# Patient Record
Sex: MALE | Race: WHITE | NOT HISPANIC OR LATINO | Employment: UNEMPLOYED | URBAN - METROPOLITAN AREA
[De-identification: names, ages, dates, MRNs, and addresses within clinical notes are randomized per-mention and may not be internally consistent; named-entity substitution may affect disease eponyms.]

---

## 2024-09-10 ENCOUNTER — OFFICE VISIT (OUTPATIENT)
Dept: FAMILY MEDICINE | Facility: CLINIC | Age: 29
End: 2024-09-10
Payer: MEDICAID

## 2024-09-10 DIAGNOSIS — E11.65 INADEQUATELY CONTROLLED DIABETES MELLITUS: Primary | ICD-10-CM

## 2024-09-10 DIAGNOSIS — Z11.3 SCREEN FOR STD (SEXUALLY TRANSMITTED DISEASE): ICD-10-CM

## 2024-09-10 DIAGNOSIS — I10 ESSENTIAL HYPERTENSION, BENIGN: ICD-10-CM

## 2024-09-10 DIAGNOSIS — E78.2 MIXED HYPERLIPIDEMIA: ICD-10-CM

## 2024-09-10 DIAGNOSIS — Z11.59 ENCOUNTER FOR HEPATITIS C SCREENING TEST FOR LOW RISK PATIENT: ICD-10-CM

## 2024-09-10 PROCEDURE — 1159F MED LIST DOCD IN RCRD: CPT | Mod: CPTII,S$GLB,, | Performed by: INTERNAL MEDICINE

## 2024-09-10 PROCEDURE — 99499 UNLISTED E&M SERVICE: CPT | Mod: S$GLB,,, | Performed by: INTERNAL MEDICINE

## 2024-09-10 PROCEDURE — 1160F RVW MEDS BY RX/DR IN RCRD: CPT | Mod: CPTII,S$GLB,, | Performed by: INTERNAL MEDICINE

## 2024-09-11 ENCOUNTER — PATIENT MESSAGE (OUTPATIENT)
Dept: FAMILY MEDICINE | Facility: CLINIC | Age: 29
End: 2024-09-11
Payer: MEDICAID

## 2024-09-11 NOTE — PROGRESS NOTES
Subjective:       Patient ID: Constantino Carbone is a 29 y.o. male.    Chief Complaint: Establish Care (Patient is here to establish care. )      Mr Carbone is a new patient who presents today to establish care and for management of the chronic problems , refills and preventive medicine    H/o poorly cont DM, hypertension and hyperlipidemia    He usually goes to [facility] for management    Has no particular complaints today except for skin lesions according to the nursing staff    Patient got upset when I asked him to turn the phone off and he walked out of the clinic before completing the visit        Review of Systems   Integumentary:  Positive for mole/lesion.   Hx taken by nursing staff      Objective:      Physical Exam  was not completed as patient walked out before conclusion of the encounter  Assessment:       1. Inadequately controlled diabetes mellitus  -     Hepatitis C Antibody; Future; Expected date: 09/11/2024  -     Microalbumin/Creatinine Ratio, Urine; Future; Expected date: 09/11/2024  -     Comprehensive Metabolic Panel; Future; Expected date: 09/11/2024    2. Mixed hyperlipidemia  -     Lipid Panel; Future; Expected date: 09/11/2024  -     Comprehensive Metabolic Panel; Future; Expected date: 09/11/2024    3. Essential hypertension, benign  -     Comprehensive Metabolic Panel; Future; Expected date: 09/11/2024    4. Encounter for hepatitis C screening test for low risk patient  -     Hemoglobin A1C; Future; Expected date: 09/11/2024    5. Screen for STD (sexually transmitted disease)  -     HIV 1/2 Ag/Ab (4th Gen); Future; Expected date: 03/11/2025           Plan:       1. Inadequately controlled diabetes mellitus  -     Hepatitis C Antibody; Future; Expected date: 09/11/2024  -     Microalbumin/Creatinine Ratio, Urine; Future; Expected date: 09/11/2024  -     Comprehensive Metabolic Panel; Future; Expected date: 09/11/2024    2. Mixed hyperlipidemia  -     Lipid Panel; Future; Expected date:  09/11/2024  -     Comprehensive Metabolic Panel; Future; Expected date: 09/11/2024    3. Essential hypertension, benign  -     Comprehensive Metabolic Panel; Future; Expected date: 09/11/2024    4. Encounter for hepatitis C screening test for low risk patient  -     Hemoglobin A1C; Future; Expected date: 09/11/2024    5. Screen for STD (sexually transmitted disease)  -     HIV 1/2 Ag/Ab (4th Gen); Future; Expected date: 03/11/2025          I reach out to the patient today September 11 via texting systems  Patient was asked to reconsider coming back to the clinic for refills , labs and management of his diabetes and weight problems